# Patient Record
Sex: MALE | Race: WHITE | HISPANIC OR LATINO
[De-identification: names, ages, dates, MRNs, and addresses within clinical notes are randomized per-mention and may not be internally consistent; named-entity substitution may affect disease eponyms.]

---

## 2021-12-08 PROBLEM — Z00.00 ENCOUNTER FOR PREVENTIVE HEALTH EXAMINATION: Status: ACTIVE | Noted: 2021-12-08

## 2021-12-14 ENCOUNTER — NON-APPOINTMENT (OUTPATIENT)
Age: 46
End: 2021-12-14

## 2021-12-14 ENCOUNTER — APPOINTMENT (OUTPATIENT)
Dept: COLORECTAL SURGERY | Facility: CLINIC | Age: 46
End: 2021-12-14
Payer: MEDICAID

## 2021-12-14 VITALS
HEIGHT: 71 IN | WEIGHT: 200 LBS | SYSTOLIC BLOOD PRESSURE: 119 MMHG | TEMPERATURE: 98.2 F | HEART RATE: 59 BPM | DIASTOLIC BLOOD PRESSURE: 83 MMHG | BODY MASS INDEX: 28 KG/M2

## 2021-12-14 DIAGNOSIS — K64.8 OTHER HEMORRHOIDS: ICD-10-CM

## 2021-12-14 PROCEDURE — 99203 OFFICE O/P NEW LOW 30 MIN: CPT | Mod: 25

## 2021-12-14 PROCEDURE — 46221 LIGATION OF HEMORRHOID(S): CPT

## 2021-12-14 NOTE — PHYSICAL EXAM
[FreeTextEntry1] : Medical assistant present for duration of physical examination\par \par General no acute distress, alert and oriented\par Psych calm, pleasant demeanor, responding appropriately to questions\par Nonlabored breathing\par Ambulating without assistance\par Skin normal color and pigment, no visible lesions or rashes\par \par \par Anorectal Exam:\par Inspection no erythema, induration or fluctuance, no skin excoriation, no fissure, soft external hemorrhoidal cushions (most prominent left lateral) without inflammation or thrombosis\par LETY nontender, no masses palpated, no blood on gloved finger\par \par Procedure: Anoscopy\par \par Pre procedure Diagnosis: hemorrhoids\par Post procedure Diagnosis: hemorrhoids\par Anesthesia: none\par Estimated blood loss: none\par Specimen: none\par Complications: none\par \par Consent obtained. Anoscopy was performed by passing a lighted anoscope with lubricant jelly into the anal canal and the entire anal mucosal surface was inspected. Findings included moderate to large internal hemorrhoids - left lateral, right anterior, right posterior\par \par Patient tolerated examination and procedure well.\par \par \par

## 2021-12-14 NOTE — PROCEDURE
[FreeTextEntry1] : Rubber Band Ligation\par \par Pre-procedure Diagnosis: Symptomatic Internal Hemorrhoids\par Post-procedure Diagnosis: Symptomatic Internal Hemorrhoids\par Procedure: Anoscopy with Rubber Band Ligation\par Anesthesia: none\par Estimated blood loss: minimal\par Specimen: none\par Drain: none\par Complications: none\par \par Indications: Patient presents with history of symptoms related to internal hemorrhoids. On physical exam, moderate to large internal hemorrhoids were detected. Risks/benefits/alternative were discussed and patient agreed to proceed with office based procedure.\par \par Procedure Details: Consent obtained. Patient was placed in a modified prone amy-knife position on the examination table. Digital rectal exam was performed with an index finger with surgical jelly lubricant. An anoscope was inserted into the anal canal, and a prominent hemorrhoidal bundle was identified in the left lateral position. Using the hemorrhoid ligation device, a rubber band was placed around this hemorrhoid. No active bleeding was noted. The anoscope was removed. The patient tolerated the procedure well.\par \par Post-procedure instructions were reviewed with the patient, including recommendation to notify office immediately for any symptoms of severe pain, bleeding, inability to urinate, fever, or any other concern. All questions answered.\par

## 2021-12-14 NOTE — ASSESSMENT
[FreeTextEntry1] : Exam findings and diagnosis were discussed at length with patient. \par Recommendations including increased fiber intake, adequate daily hydration, stool softeners as needed, and sitz baths as needed and after bowel movements were discussed.\par Fiber goal 30g/day, recommend psyllium supplement. Educational material regarding fiber provided\par Adequate oral hydration - goal 64oz water/day\par Avoid constipation and diarrhea, avoid pushing/straining.\par Symptoms have persisted despite topical creams, both prescription and OTC.\par Office based treatment, such as rubber band ligation, and surgery was discussed as an alternative if symptoms persist despite conservative management. Risks and benefits discussed.\par \par Recommend rubber band ligation treatments. Patient agreeable and understands multiple treatments may be performed for multiple hemorrhoids. If no improvement with office treatment, can consider surgical hemorrhoidectomy.\par First ligation performed today - left lateral\par \par F/u in 3 weeks or sooner as needed.\par All questions answered, patient expressed understanding and is agreeable to this plan.\par

## 2021-12-14 NOTE — HISTORY OF PRESENT ILLNESS
[FreeTextEntry1] : 47 y/o M presents for evaluation of hemorrhoids\par \par C/o issues with internal prolapsing hemorrhoids for the past 18 years. Hemorrhoids prolapse with BM's and walking. Needs to manually reduce or sit on firm surfaces\par Admits to significant bleeding that squirts in to the bowl after almost every BM\par Denies pain or itching\par Has tried OTC and prescription medications for hemorrhoids without improvement in symptoms\par BH:4-5 times daily \par Denies constipation, diarrhea or straining\par No use of stool softeners, fiber supplements or laxatives\par Trying to work on dietary fiber intake. Recently cut out alcohol - last drink 1 month ago. Currently one bottle of water daily and mostly soda \par (-) anal receptive sex\par Had a colonoscopy 10 years ago, uncertain if a polyp was removed\par Denies [facility] of IBD or colorectal cancer\par No ASA/NSAIDs last 7 days